# Patient Record
Sex: FEMALE
[De-identification: names, ages, dates, MRNs, and addresses within clinical notes are randomized per-mention and may not be internally consistent; named-entity substitution may affect disease eponyms.]

---

## 2021-10-18 ENCOUNTER — APPOINTMENT (OUTPATIENT)
Dept: OTOLARYNGOLOGY | Facility: CLINIC | Age: 61
End: 2021-10-18
Payer: MEDICAID

## 2021-10-18 VITALS
HEART RATE: 71 BPM | BODY MASS INDEX: 30.56 KG/M2 | TEMPERATURE: 97.7 F | OXYGEN SATURATION: 97 % | HEIGHT: 64 IN | WEIGHT: 179 LBS | SYSTOLIC BLOOD PRESSURE: 109 MMHG | DIASTOLIC BLOOD PRESSURE: 72 MMHG

## 2021-10-18 DIAGNOSIS — H61.22 IMPACTED CERUMEN, LEFT EAR: ICD-10-CM

## 2021-10-18 PROBLEM — Z00.00 ENCOUNTER FOR PREVENTIVE HEALTH EXAMINATION: Status: ACTIVE | Noted: 2021-10-18

## 2021-10-18 PROCEDURE — 99203 OFFICE O/P NEW LOW 30 MIN: CPT | Mod: 25

## 2021-10-18 NOTE — PROCEDURE
[FreeTextEntry3] : Cerumen Removal/Ear Cleaning for Otitis Externa\par Pre-operative Diagnosis: Left Cerumen Impaction\par Post-operative Diagnosis: Same\par Procedure:  Binocular microscopy with cerumen removal- 62260\par Procedure Details:  \par The patient was placed in the supine position.  The operating microscope was positioned.  I then placed the ear speculum in the Left EAC.  Cerumen was then removed using a mixture of otologic curettes, and suction.  The TM was noted to be intact.  The patient tolerated procedure well.\par \par Findings: \par Bilateral Ear Canal - normal\par Bilateral Tympanic Membrane - normal\par \par Recommendations: Debrox\par Complications: None\par \par

## 2021-10-18 NOTE — PHYSICAL EXAM
[Midline] : trachea located in midline position [Normal] : no rashes [de-identified] : left cerumen impaction cleaned away

## 2021-10-18 NOTE — HISTORY OF PRESENT ILLNESS
[de-identified] : 62 yo female who presents with concern for hearing loss.  This has been a long standing issue. She feels that it may be progressively worse on the left.  She does have bilateral high pitched non pulsatile tinnitus.  No otorrhea, otalgia, vertiginous symptoms.  She intermittently will use qtips.  No significant loud noise exposure.  Tinnitus is worse in quiet environments.  No ENT issues otherwise.

## 2021-10-18 NOTE — ASSESSMENT
[FreeTextEntry1] : 62 yo female who presents with concern for hearing loss and tinnitus.  On exam there was evidence of left sided cerumen impaction and this was cleaned away.  She noted improvement in terms of asymmetry, though tinnitus remained.  I suspect this is secondary to hearing loss.  I plan to order audio/tymp and patient to follow up after to review.  We did discuss conservative treatment strategies for tinnitus.\par \par - audio/tymp\par - fu after to review

## 2021-10-22 ENCOUNTER — APPOINTMENT (OUTPATIENT)
Dept: OTOLARYNGOLOGY | Facility: CLINIC | Age: 61
End: 2021-10-22
Payer: MEDICAID

## 2021-10-22 PROCEDURE — 92550 TYMPANOMETRY & REFLEX THRESH: CPT

## 2021-10-22 PROCEDURE — 92557 COMPREHENSIVE HEARING TEST: CPT

## 2021-11-01 ENCOUNTER — APPOINTMENT (OUTPATIENT)
Dept: OTOLARYNGOLOGY | Facility: CLINIC | Age: 61
End: 2021-11-01
Payer: MEDICAID

## 2021-11-01 VITALS
HEIGHT: 64 IN | OXYGEN SATURATION: 97 % | DIASTOLIC BLOOD PRESSURE: 72 MMHG | WEIGHT: 179 LBS | HEART RATE: 73 BPM | SYSTOLIC BLOOD PRESSURE: 103 MMHG | BODY MASS INDEX: 30.56 KG/M2 | TEMPERATURE: 97.3 F

## 2021-11-01 PROCEDURE — 99213 OFFICE O/P EST LOW 20 MIN: CPT

## 2021-11-01 NOTE — HISTORY OF PRESENT ILLNESS
[de-identified] : 62 yo female who presents with concern for hearing loss.  This has been a long standing issue. She feels that it may be progressively worse on the left.  She does have bilateral high pitched non pulsatile tinnitus.  No otorrhea, otalgia, vertiginous symptoms.  She intermittently will use qtips.  No significant loud noise exposure.  Tinnitus is worse in quiet environments.  No ENT issues otherwise.\par - [FreeTextEntry1] : Update 11/1/21\par Pt is overall stable.  No change in symptoms.  No new ENT issues.  She did complete audio/tymp and presents to review.

## 2021-11-01 NOTE — ASSESSMENT
[FreeTextEntry1] : 60 yo female who presents with concern for hearing loss and tinnitus.  Exam today is normal.  Audio is consistent with both asymmetric hearing as well as some mixed hearing loss with Type A tympanograms.  At this point I am recommending MRI/IAC to rule out any retrocochlear process.  MRI will be able to ascertain any inflammation as well.  If normal she will need CT temporal bone as well.  In the interim I am recommending nasal saline and nasal steroid to potentially help open the middle ear.  She will follow up after MRI to review results.\par \par - MRI IAC to rule out retrocochlear process\par - nasal saline, nasal steroids\par - fu after to review results\par - consider CT temporal bone and/or referral to neuro-otology.\par \par

## 2021-11-01 NOTE — DATA REVIEWED
[de-identified] : -\par 10/22/21\par Audio\par Right - mild to mod-severe rising to mild SNHL \par Left - mild rising to mod-severe mixed HL\par Tymp type A bilaterally

## 2021-11-15 ENCOUNTER — OUTPATIENT (OUTPATIENT)
Dept: OUTPATIENT SERVICES | Facility: HOSPITAL | Age: 61
LOS: 1 days | End: 2021-11-15

## 2021-11-15 ENCOUNTER — APPOINTMENT (OUTPATIENT)
Dept: MRI IMAGING | Facility: CLINIC | Age: 61
End: 2021-11-15
Payer: MEDICAID

## 2021-11-15 ENCOUNTER — RESULT REVIEW (OUTPATIENT)
Age: 61
End: 2021-11-15

## 2021-11-15 PROCEDURE — 70553 MRI BRAIN STEM W/O & W/DYE: CPT | Mod: 26

## 2021-11-30 ENCOUNTER — APPOINTMENT (OUTPATIENT)
Dept: OTOLARYNGOLOGY | Facility: CLINIC | Age: 61
End: 2021-11-30
Payer: MEDICAID

## 2021-11-30 VITALS
OXYGEN SATURATION: 98 % | HEART RATE: 75 BPM | BODY MASS INDEX: 30.56 KG/M2 | WEIGHT: 179 LBS | DIASTOLIC BLOOD PRESSURE: 74 MMHG | HEIGHT: 64 IN | SYSTOLIC BLOOD PRESSURE: 113 MMHG

## 2021-11-30 DIAGNOSIS — H91.8X9 OTHER SPECIFIED HEARING LOSS, UNSPECIFIED EAR: ICD-10-CM

## 2021-11-30 DIAGNOSIS — H93.13 TINNITUS, BILATERAL: ICD-10-CM

## 2021-11-30 DIAGNOSIS — H90.3 SENSORINEURAL HEARING LOSS, BILATERAL: ICD-10-CM

## 2021-11-30 PROCEDURE — 99214 OFFICE O/P EST MOD 30 MIN: CPT | Mod: 25

## 2021-11-30 PROCEDURE — 31231 NASAL ENDOSCOPY DX: CPT

## 2021-11-30 NOTE — PROCEDURE
[FreeTextEntry6] : -\par Procedure Note\par   \par Pre-operative Diagnosis: sinusitis seen on MRI\par Post-operative Diagnosis: normal exam without evidence of infection inflammation or purulence.\par Anesthesia: Topical\par Procedure: Bilateral nasal endoscopy\par   \par Procedure Details: \par After topical anesthesia and decongestant, the patient was placed in the supine position. The telescope was passed along the left nasal floor to the nasopharynx. It was then passed into the region of the middle meatus, middle turbinate, and the sphenoethmoid region.  An identical procedure was performed on the right side. \par   \par Findings: \par Mucosa: 	                normal	\par Nasal septum: 	midline 	\par Discharge: 	none	\par Turbinates: 	normal	\par Adenoid: 	                normal	\par Posterior choanae: 	normal	\par Eustachian tubes: 	normal	\par Mucous stranding: 	normal 	\par Lesions: 	                Not present	\par   \par Comments: \par Condition: Stable. Patient tolerated procedure well.\par Complications: None\par \par

## 2021-11-30 NOTE — ASSESSMENT
[FreeTextEntry1] : 62 yo female who presents with concern for hearing loss and tinnitus.  Exam today is normal.  Audio is consistent with both asymmetric hearing as well as some mixed hearing loss with Type A tympanograms.  MRI/IAC to rule out any retrocochlear process and this was negative.  There was some evidence of left sided sinusitis. \par \par At this point I think it is prudent to order a CT temporal bone given conductive component with a type A tymp.\par  I am recommending nasal saline and nasal steroid to potentially help open the middle ear and sinusitis seen on MRI.  She will follow up after CT to review results via telehealth.  \par \par I don't believe that this is Menieres or a hydrops picture, but there is a mixed hearing loss with asymmetry, at this point I think it would be prudent to be seen by neurootology, referral information given.\par \par - nasal saline, nasal steroids\par - CT temporal bone fu after to review\par - referral to neuro-otology.\par \par

## 2021-11-30 NOTE — HISTORY OF PRESENT ILLNESS
[de-identified] : 62 yo female who presents with concern for hearing loss.  This has been a long standing issue. She feels that it may be progressively worse on the left.  She does have bilateral high pitched non pulsatile tinnitus.  No otorrhea, otalgia, vertiginous symptoms.  She intermittently will use qtips.  No significant loud noise exposure.  Tinnitus is worse in quiet environments.  No ENT issues otherwise.\par -\par Update 11/1/21\par Pt is overall stable.  No change in symptoms.  No new ENT issues.  She did complete audio/tymp and presents to review.\par - [FreeTextEntry1] : Update 11/1/21\par Pt is overall stable.  She did not usee nasal saline and nasal steroids.  No change in symptoms.  No new ENT issues.  She did complete MRI/IAC and presents to review.

## 2021-12-01 ENCOUNTER — APPOINTMENT (OUTPATIENT)
Dept: CT IMAGING | Facility: CLINIC | Age: 61
End: 2021-12-01
Payer: MEDICAID

## 2021-12-01 ENCOUNTER — OUTPATIENT (OUTPATIENT)
Dept: OUTPATIENT SERVICES | Facility: HOSPITAL | Age: 61
LOS: 1 days | End: 2021-12-01

## 2021-12-01 ENCOUNTER — RESULT REVIEW (OUTPATIENT)
Age: 61
End: 2021-12-01

## 2021-12-01 PROCEDURE — 70480 CT ORBIT/EAR/FOSSA W/O DYE: CPT | Mod: 26

## 2021-12-07 ENCOUNTER — APPOINTMENT (OUTPATIENT)
Dept: OTOLARYNGOLOGY | Facility: CLINIC | Age: 61
End: 2021-12-07
Payer: MEDICAID

## 2021-12-07 VITALS — TEMPERATURE: 98.4 F | BODY MASS INDEX: 30.48 KG/M2 | WEIGHT: 172 LBS | HEIGHT: 63 IN

## 2021-12-07 DIAGNOSIS — H74.313: ICD-10-CM

## 2021-12-07 DIAGNOSIS — H90.8 MIXED CONDUCTIVE AND SENSORINEURAL HEARING LOSS, UNSPECIFIED: ICD-10-CM

## 2021-12-07 PROCEDURE — 99215 OFFICE O/P EST HI 40 MIN: CPT | Mod: 25

## 2021-12-07 PROCEDURE — 92504 EAR MICROSCOPY EXAMINATION: CPT

## 2021-12-07 NOTE — REASON FOR VISIT
[Hearing Loss] : hearing loss [Tinnitus] : tinnitus [Subsequent Evaluation] : a subsequent evaluation for

## 2021-12-07 NOTE — PHYSICAL EXAM
[FreeTextEntry1] : Procedure: Microscopic Ear Exam\par \par Left ear:  Ear canal intact without inflammation or lesion.  \par Tympanic membrane intact without inflammation.\par \par Right ear:  Ear canal intact without inflammation or lesion.  \par Tympanic membrane intact without inflammation.\par \par [place] Hearing Assessment\par 512 Hz:\par Ceja test: referred to the bilateral\par Rinne test:\par 	Right Ear: Air Conduction > Bone Conduction\par 	Left Ear:   Air Conduction > Bone conduction [Midline] : trachea located in midline position [Normal] : no rashes

## 2021-12-07 NOTE — HISTORY OF PRESENT ILLNESS
[de-identified] : Subspecialty consultation by ENT colleague for mixed hearing loss\par BERRY DAS has a history of progressive hearing loss bilaterally since or about 1990's.  History is provided by patients daughter due to language barrier.  Possibly related to IV antibiotics many years ago.  Tinnitus reported.  Occasional dizziness reported. No known prior ear disease.

## 2021-12-07 NOTE — ASSESSMENT
[FreeTextEntry1] : Significant bilateral hearing loss. There is a conductive component, which appears to be associated with malleus fixation. 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 I reviewed these findings with the patient and her daughter in detail. I have reviewed management options in detail. Although surgical intervention of her left ear may provide benefit, amplification with likely still be needed.\par \par I have recommended evaluation for bilateral amplification and he referred her to the audiologist.\par \par Followup recommended.

## 2021-12-07 NOTE — CONSULT LETTER
[Please see my note below.] : Please see my note below. [FreeTextEntry2] : Dear SAUL BURNS  [FreeTextEntry1] : Thank you for allowing me to participate in the care of BERRY DAS .\par Please see the attached visit note.\par \par \par \par Phill Mcclain\par Otology\par Medical Director of [facility]\par Department of Otolaryngology\par [facility]

## 2021-12-10 ENCOUNTER — APPOINTMENT (OUTPATIENT)
Dept: OTOLARYNGOLOGY | Facility: CLINIC | Age: 61
End: 2021-12-10

## 2021-12-16 ENCOUNTER — APPOINTMENT (OUTPATIENT)
Dept: OTOLARYNGOLOGY | Facility: CLINIC | Age: 61
End: 2021-12-16
Payer: MEDICAID

## 2021-12-16 PROCEDURE — V5010 ASSESSMENT FOR HEARING AID: CPT

## 2022-01-20 ENCOUNTER — APPOINTMENT (OUTPATIENT)
Dept: OTOLARYNGOLOGY | Facility: CLINIC | Age: 62
End: 2022-01-20
Payer: MEDICAID

## 2022-01-20 PROCEDURE — V5261I: CUSTOM

## 2022-01-20 PROCEDURE — V5160: CPT

## 2022-01-27 ENCOUNTER — APPOINTMENT (OUTPATIENT)
Dept: OTOLARYNGOLOGY | Facility: CLINIC | Age: 62
End: 2022-01-27

## 2022-02-10 ENCOUNTER — APPOINTMENT (OUTPATIENT)
Dept: OTOLARYNGOLOGY | Facility: CLINIC | Age: 62
End: 2022-02-10
Payer: MEDICAID

## 2022-02-10 PROCEDURE — V5266A: CUSTOM

## 2022-02-10 PROCEDURE — V5011: CPT

## 2022-03-02 ENCOUNTER — RX RENEWAL (OUTPATIENT)
Age: 62
End: 2022-03-02

## 2022-03-03 RX ORDER — FLUTICASONE PROPIONATE 50 UG/1
50 SPRAY, METERED NASAL DAILY
Qty: 16 | Refills: 2 | Status: ACTIVE | COMMUNITY
Start: 2021-11-30 | End: 1900-01-01

## 2023-10-11 ENCOUNTER — APPOINTMENT (OUTPATIENT)
Dept: OTOLARYNGOLOGY | Facility: CLINIC | Age: 63
End: 2023-10-11
Payer: MEDICAID

## 2023-10-11 PROCEDURE — V5266A: CUSTOM

## 2023-10-11 PROCEDURE — V5014: CPT

## 2024-04-05 ENCOUNTER — APPOINTMENT (OUTPATIENT)
Dept: OTOLARYNGOLOGY | Facility: CLINIC | Age: 64
End: 2024-04-05
Payer: MEDICAID

## 2024-04-05 PROCEDURE — 92593: CPT | Mod: NC

## 2024-04-18 ENCOUNTER — APPOINTMENT (OUTPATIENT)
Dept: OTOLARYNGOLOGY | Facility: CLINIC | Age: 64
End: 2024-04-18
Payer: MEDICAID

## 2024-04-18 PROCEDURE — 92593: CPT | Mod: NC
